# Patient Record
Sex: FEMALE | Race: WHITE | NOT HISPANIC OR LATINO | Employment: OTHER | URBAN - METROPOLITAN AREA
[De-identification: names, ages, dates, MRNs, and addresses within clinical notes are randomized per-mention and may not be internally consistent; named-entity substitution may affect disease eponyms.]

---

## 2017-05-16 ENCOUNTER — OFFICE VISIT (OUTPATIENT)
Dept: OBSTETRICS AND GYNECOLOGY | Facility: CLINIC | Age: 62
End: 2017-05-16

## 2017-05-16 VITALS
HEIGHT: 63 IN | WEIGHT: 147.5 LBS | BODY MASS INDEX: 26.13 KG/M2 | SYSTOLIC BLOOD PRESSURE: 120 MMHG | DIASTOLIC BLOOD PRESSURE: 74 MMHG

## 2017-05-16 DIAGNOSIS — Z13.9 SCREENING: ICD-10-CM

## 2017-05-16 DIAGNOSIS — Z01.411 ENCOUNTER FOR GYNECOLOGICAL EXAMINATION WITH ABNORMAL FINDING: Primary | ICD-10-CM

## 2017-05-16 DIAGNOSIS — N94.10 DYSPAREUNIA IN FEMALE: ICD-10-CM

## 2017-05-16 DIAGNOSIS — K60.0 ACUTE ANAL FISSURE: ICD-10-CM

## 2017-05-16 DIAGNOSIS — N90.4 LICHEN SCLEROSUS ET ATROPHICUS OF THE VULVA: ICD-10-CM

## 2017-05-16 DIAGNOSIS — Z12.4 SCREENING FOR MALIGNANT NEOPLASM OF CERVIX: ICD-10-CM

## 2017-05-16 DIAGNOSIS — Z13.820 OSTEOPOROSIS SCREENING: ICD-10-CM

## 2017-05-16 LAB
BILIRUB BLD-MCNC: NEGATIVE MG/DL
CLARITY, POC: CLEAR
COLOR UR: YELLOW
GLUCOSE UR STRIP-MCNC: NEGATIVE MG/DL
KETONES UR QL: NEGATIVE
LEUKOCYTE EST, POC: NEGATIVE
NITRITE UR-MCNC: NEGATIVE MG/ML
PH UR: 7 [PH] (ref 5–8)
PROT UR STRIP-MCNC: NEGATIVE MG/DL
RBC # UR STRIP: NEGATIVE /UL
SP GR UR: 1.01 (ref 1–1.03)
UROBILINOGEN UR QL: NORMAL

## 2017-05-16 PROCEDURE — 99386 PREV VISIT NEW AGE 40-64: CPT | Performed by: OBSTETRICS & GYNECOLOGY

## 2017-05-16 PROCEDURE — 81002 URINALYSIS NONAUTO W/O SCOPE: CPT | Performed by: OBSTETRICS & GYNECOLOGY

## 2017-05-16 RX ORDER — FAMOTIDINE 10 MG
10 TABLET ORAL 2 TIMES DAILY
COMMUNITY

## 2017-05-16 RX ORDER — LORATADINE 10 MG/1
10 CAPSULE, LIQUID FILLED ORAL EVERY MORNING
COMMUNITY

## 2017-05-16 RX ORDER — CLOBETASOL PROPIONATE 0.5 MG/G
OINTMENT TOPICAL DAILY
Qty: 60 G | Refills: 1 | Status: SHIPPED | OUTPATIENT
Start: 2017-05-16 | End: 2017-06-13

## 2017-05-23 PROBLEM — K60.0 ACUTE ANAL FISSURE: Status: ACTIVE | Noted: 2017-05-23

## 2017-05-23 PROBLEM — N90.4 LICHEN SCLEROSUS ET ATROPHICUS OF THE VULVA: Status: ACTIVE | Noted: 2017-05-23

## 2017-05-23 PROBLEM — N94.10 DYSPAREUNIA IN FEMALE: Status: ACTIVE | Noted: 2017-05-23

## 2017-05-23 LAB
CYTOLOGIST CVX/VAG CYTO: NORMAL
CYTOLOGY CVX/VAG DOC THIN PREP: NORMAL
DX ICD CODE: NORMAL
HIV 1 & 2 AB SER-IMP: NORMAL
HPV I/H RISK 1 DNA CVX QL PROBE+SIG AMP: NORMAL
HPV I/H RISK 4 DNA CVX QL PROBE+SIG AMP: NEGATIVE
Lab: NORMAL
OTHER STN SPEC: NORMAL
PATH REPORT.FINAL DX SPEC: NORMAL
RECOM F/U CVX/VAG CYTO: NORMAL
STAT OF ADQ CVX/VAG CYTO-IMP: NORMAL

## 2017-06-13 ENCOUNTER — OFFICE VISIT (OUTPATIENT)
Dept: OBSTETRICS AND GYNECOLOGY | Facility: CLINIC | Age: 62
End: 2017-06-13

## 2017-06-13 VITALS
BODY MASS INDEX: 25.69 KG/M2 | DIASTOLIC BLOOD PRESSURE: 74 MMHG | SYSTOLIC BLOOD PRESSURE: 120 MMHG | WEIGHT: 145 LBS | HEIGHT: 63 IN

## 2017-06-13 DIAGNOSIS — Z13.9 SCREENING: ICD-10-CM

## 2017-06-13 DIAGNOSIS — K60.0 ACUTE ANAL FISSURE: ICD-10-CM

## 2017-06-13 DIAGNOSIS — N90.4 LICHEN SCLEROSUS ET ATROPHICUS OF THE VULVA: Primary | ICD-10-CM

## 2017-06-13 PROCEDURE — 99213 OFFICE O/P EST LOW 20 MIN: CPT | Performed by: OBSTETRICS & GYNECOLOGY

## 2017-06-14 DIAGNOSIS — Z13.820 OSTEOPOROSIS SCREENING: ICD-10-CM

## 2017-06-14 NOTE — PROGRESS NOTES
"FU VISIT    Chief Complaint: Lichen sclerosis and anal fissure fu      Skylar Godinez is a 61 y.o. patient who presents for follow up of lichen sclerosis and anal fissue. Pt has been using Clobetasol nightly for 4 weeks and Anusol to anus for 2 weeks. Pt states that the pruitis of vulva has resolved. The pain at anus has also resolved.  Chief Complaint   Patient presents with   • Follow-up     using med for lichens             The following portions of the patient's history were reviewed and updated as appropriate: allergies, current medications and problem list.    Review of Systems   Gastrointestinal: Negative for rectal pain.   Genitourinary: Negative for vaginal bleeding and vaginal pain.       /74  Ht 63\" (160 cm)  Wt 145 lb (65.8 kg)  BMI 25.69 kg/m2    Physical Exam   Genitourinary: Rectal exam shows no fissure and no tenderness. There is no tenderness or lesion on the right labia. There is no tenderness or lesion on the left labia.   Genitourinary Comments: Improved area of lichen sclerosis. Anal fissure resolved.         Assessment/Plan   Skylar was seen today for follow-up.    Diagnoses and all orders for this visit:    Lichen sclerosus et atrophicus of the vulva    Screening  -     Cancel: POC Urinalysis Dipstick    Acute anal fissure      62yo for fu of lichen sclerosis and anal fissure    1) gyn HM: pap done last month and not enough cells to report on pap or HPV. Pt declines a repeat pap smear today and states she will fu next year for it.    2) Lichen sclerosis: much improved after using Clobetasol for 1 month. Pt instructed to continue use every other night for 1 month and then to stop. If symptoms return then pt to treat herself for 2 consecutive weeks.    3) Anal fissure: resolved after 2 weeks of Anusol.             Return in about 1 year (around 6/13/2018) for Annual physical.      Isha Ayon DO    6/14/2017  3:32 PM  "

## 2017-10-10 ENCOUNTER — OFFICE VISIT (OUTPATIENT)
Dept: OBSTETRICS AND GYNECOLOGY | Facility: CLINIC | Age: 62
End: 2017-10-10

## 2017-10-10 VITALS — WEIGHT: 144.5 LBS | BODY MASS INDEX: 25.6 KG/M2 | HEIGHT: 63 IN

## 2017-10-10 DIAGNOSIS — N64.4 BREAST PAIN, LEFT: Primary | ICD-10-CM

## 2017-10-10 PROCEDURE — 99213 OFFICE O/P EST LOW 20 MIN: CPT | Performed by: OBSTETRICS & GYNECOLOGY

## 2017-10-10 NOTE — PROGRESS NOTES
"PROBLEM VISIT    Chief Complaint:      Skylar Godinez is a 62 y.o. patient who presents complaining of left breast pain and left axillary pain for the last 3 months. Pt states it feels different to her. Pt had her screening MMG 2/2017 and was told it was normal. Pt admits that her breast tenderness has improved since she started wearing a more supportive bra.  Chief Complaint   Patient presents with   • Breast Pain             The following portions of the patient's history were reviewed and updated as appropriate: allergies, current medications and problem list.    Review of Systems   Gastrointestinal: Negative for abdominal pain.   Genitourinary: Negative for pelvic pain and vaginal bleeding.       Ht 63\" (160 cm)  Wt 144 lb 8 oz (65.5 kg)  BMI 25.6 kg/m2    Physical Exam   Constitutional: She is oriented to person, place, and time. She appears well-developed and well-nourished. No distress.   Pulmonary/Chest: Right breast exhibits tenderness. Right breast exhibits no inverted nipple, no mass, no nipple discharge and no skin change. Left breast exhibits tenderness. Left breast exhibits no inverted nipple, no mass, no nipple discharge and no skin change.   Neurological: She is alert and oriented to person, place, and time.   Skin: She is not diaphoretic.   Psychiatric: She has a normal mood and affect. Her behavior is normal. Judgment and thought content normal.   Vitals reviewed.        Assessment/Plan   Skylar was seen today for breast pain.    Diagnoses and all orders for this visit:    Breast pain, left    63yo with complaints of left breast pain    Physical exam reveals that breast pain is bilateral at lateral aspects of both breasts.  No palpable masses.  Pt reports improvement in pain since she got a more supportive bra.  Recommend starting Vitamin E 400mg po daily.  Recommend coming in for a repeat exam in 3 months and if symptoms persist, will proceed with a dx MMG and US..  Pt agrees with plan.         "       Return in about 3 months (around 1/10/2018).      Isha Ayon DO    10/10/2017  3:35 PM

## 2018-02-20 ENCOUNTER — OFFICE VISIT (OUTPATIENT)
Dept: OBSTETRICS AND GYNECOLOGY | Facility: CLINIC | Age: 63
End: 2018-02-20

## 2018-02-20 VITALS
BODY MASS INDEX: 25.52 KG/M2 | HEIGHT: 63 IN | WEIGHT: 144 LBS | DIASTOLIC BLOOD PRESSURE: 70 MMHG | SYSTOLIC BLOOD PRESSURE: 100 MMHG

## 2018-02-20 DIAGNOSIS — Z80.3 FAMILY HISTORY OF BREAST CANCER: ICD-10-CM

## 2018-02-20 DIAGNOSIS — Z13.9 SCREENING FOR CONDITION: ICD-10-CM

## 2018-02-20 DIAGNOSIS — N64.4 BREAST PAIN, LEFT: Primary | ICD-10-CM

## 2018-02-20 LAB
BILIRUB BLD-MCNC: NEGATIVE MG/DL
CLARITY, POC: CLEAR
COLOR UR: YELLOW
GLUCOSE UR STRIP-MCNC: NEGATIVE MG/DL
KETONES UR QL: NEGATIVE
LEUKOCYTE EST, POC: NEGATIVE
NITRITE UR-MCNC: NEGATIVE MG/ML
PH UR: 5 [PH] (ref 5–8)
PROT UR STRIP-MCNC: NEGATIVE MG/DL
RBC # UR STRIP: NEGATIVE /UL
SP GR UR: 1 (ref 1–1.03)
UROBILINOGEN UR QL: NORMAL

## 2018-02-20 PROCEDURE — 99213 OFFICE O/P EST LOW 20 MIN: CPT | Performed by: OBSTETRICS & GYNECOLOGY

## 2018-02-20 PROCEDURE — 81002 URINALYSIS NONAUTO W/O SCOPE: CPT | Performed by: OBSTETRICS & GYNECOLOGY

## 2018-02-20 RX ORDER — CLOBETASOL PROPIONATE 0.5 MG/G
1 CREAM TOPICAL 2 TIMES DAILY PRN
COMMUNITY
End: 2022-03-31 | Stop reason: SDUPTHER

## 2018-02-20 NOTE — PROGRESS NOTES
"PROBLEM VISIT    Chief Complaint: left breast pain      Skylar Godinez is a 62 y.o. patient who presents for follow up of left breast pain. Pt reports that she is still having left breast pain. She describes it as an ache. Pt never started the Vitamin E. Pt's mother (58) and maternal aunt (70's) both have had breast cancer. Pt's last imaging was 3/2017  Chief Complaint   Patient presents with   • Follow-up     some breast pain.              The following portions of the patient's history were reviewed and updated as appropriate: allergies, current medications and problem list.    Review of Systems   Constitutional: Negative for unexpected weight change.   Gastrointestinal: Negative for abdominal distention and abdominal pain.   Genitourinary: Negative for pelvic pain.       /70  Ht 160 cm (63\")  Wt 65.3 kg (144 lb)  BMI 25.51 kg/m2    Physical Exam   Constitutional: She is oriented to person, place, and time. She appears well-developed and well-nourished. No distress.   Pulmonary/Chest: Right breast exhibits no inverted nipple, no mass, no nipple discharge, no skin change and no tenderness. Left breast exhibits no inverted nipple, no mass, no nipple discharge, no skin change and no tenderness.   No re(roducible pain or masses noted on PE.   Neurological: She is alert and oriented to person, place, and time.   Skin: She is not diaphoretic.   Psychiatric: She has a normal mood and affect. Her behavior is normal. Judgment and thought content normal.   Vitals reviewed.        Assessment/Plan   Skylar was seen today for follow-up.    Diagnoses and all orders for this visit:    Breast pain, left    Screening for condition  -     POC Urinalysis Dipstick    Family history of breast cancer      63yo for fu of left breast pain    1) left breast pain: DPt still with left breast pain. Pain is not reproducible on PE. No palpable masses. Pt reports the ache is deeper than the tissue palpated in breast. Encouraged pt to " start Vitamin E 400mg daily. Will proceed with diagnostic MMG and Left breast US. Script written for exam since pt desires for imaging to be at [facility]. Pt may need referral to breast specialist.    2) Family Hx of breast cancer: Pt declines BRCA screening.    3) Gyn HM: LPS 5/2017             No Follow-up on file.      Isha Ayon,     2/20/2018  9:54 AM

## 2018-07-10 ENCOUNTER — OFFICE VISIT (OUTPATIENT)
Dept: OBSTETRICS AND GYNECOLOGY | Facility: CLINIC | Age: 63
End: 2018-07-10

## 2018-07-10 VITALS
SYSTOLIC BLOOD PRESSURE: 110 MMHG | DIASTOLIC BLOOD PRESSURE: 70 MMHG | WEIGHT: 148 LBS | BODY MASS INDEX: 26.22 KG/M2 | HEIGHT: 63 IN

## 2018-07-10 DIAGNOSIS — Z01.419 WELL WOMAN EXAM WITH ROUTINE GYNECOLOGICAL EXAM: Primary | ICD-10-CM

## 2018-07-10 PROCEDURE — 99396 PREV VISIT EST AGE 40-64: CPT | Performed by: OBSTETRICS & GYNECOLOGY

## 2018-07-10 PROCEDURE — 81002 URINALYSIS NONAUTO W/O SCOPE: CPT | Performed by: OBSTETRICS & GYNECOLOGY

## 2018-07-10 RX ORDER — AMOXICILLIN AND CLAVULANATE POTASSIUM 875; 125 MG/1; MG/1
TABLET, FILM COATED ORAL EVERY 12 HOURS
COMMUNITY
End: 2018-07-16

## 2018-07-10 RX ORDER — KETOCONAZOLE 20 MG/G
CREAM TOPICAL
COMMUNITY
End: 2022-08-01

## 2018-07-10 NOTE — PROGRESS NOTES
GYN Annual Exam     CC- Here for annual exam.     Skylar Godinez is a 62 y.o. female who presents for annual well woman exam. Menopause age 54. No PMPB. No vasomotor symptoms. No hx of HRT. Hx of left breast pain that has since resolved. Still with dyspareunia but never used estrogen cream and declines using it. Pt complaining of a bump on her rectum that is draining. She has been using steroid cream for the last month and the area has not healed.    OB History      Para Term  AB Living    3 2 2   1 2    SAB TAB Ectopic Molar Multiple Live Births    1                    Current contraception: none  History of abnormal Pap smear: no  History of abnormal mammogram: yes - hx of breast biopsy/excision benign.  Family history of uterine, colon or ovarian cancer: yes - paternal aunt with a gynecoloic cancer but pt is unsure if it is ovarian or not?  Family history of breast cancer: yes - mother dx age 58. Maternal aunt dx in 70's.    Health Maintenance   Topic Date Due   • ANNUAL PHYSICAL  08/10/1957   • TDAP/TD VACCINES (1 - Tdap) 08/10/1973   • ZOSTER VACCINE (1 of 2) 08/10/2004   • HEPATITIS C SCREENING  2017   • COLONOSCOPY  2017   • INFLUENZA VACCINE  2018   • MAMMOGRAM  2020   • PAP SMEAR  2020       Past Medical History:   Diagnosis Date   • Lichen sclerosus of female genitalia        Past Surgical History:   Procedure Laterality Date   • ADENOIDECTOMY     • ANAL FISTULA REPAIR     • SPLENECTOMY           Current Outpatient Prescriptions:   •  amoxicillin-clavulanate (AUGMENTIN) 875-125 MG per tablet, Every 12 (Twelve) Hours., Disp: , Rfl:   •  clobetasol (TEMOVATE) 0.05 % cream, 2 (Two) Times a Day., Disp: , Rfl:   •  famotidine (PEPCID) 10 MG tablet, Take 10 mg by mouth 2 (Two) Times a Day., Disp: , Rfl:   •  ketoconazole (NIZORAL) 2 % cream, ketoconazole 2 % topical cream  APPLY TO THE AFFECTED AREA(S) BY TOPICAL ROUTE TWO TIMES A DAY, Disp: , Rfl:   •  Loratadine  "(CLARITIN) 10 MG capsule, Take  by mouth., Disp: , Rfl:   •  PROCTO-MED HC 2.5 % rectal cream, APPLY TO AFFECTED AREA(S)  RECTALLY TWO TIMES A DAY, Disp: 30 g, Rfl: 0    Allergies   Allergen Reactions   • Sulfa Antibiotics Other (See Comments)     Caused bleeding        Social History   Substance Use Topics   • Smoking status: Never Smoker   • Smokeless tobacco: Never Used   • Alcohol use Yes      Comment: occ       Family History   Problem Relation Age of Onset   • Prostate cancer Father    • Breast cancer Mother    • Hypertension Mother    • Hypertension Paternal Grandfather    • Brain cancer Paternal Grandmother    • Melanoma Maternal Grandmother    • Hypertension Maternal Grandfather    • Breast cancer Maternal Aunt        Review of Systems   Gastrointestinal: Positive for rectal pain. Negative for abdominal distention, abdominal pain, anal bleeding, blood in stool, constipation and diarrhea.   Genitourinary: Positive for dyspareunia. Negative for menstrual problem, pelvic pain, vaginal bleeding, vaginal discharge and vaginal pain.       /70   Ht 160 cm (63\")   Wt 67.1 kg (148 lb)   Breastfeeding? No   BMI 26.22 kg/m²     Physical Exam   Constitutional: She is oriented to person, place, and time. She appears well-developed and well-nourished.   HENT:   Mouth/Throat: Normal dentition. No dental caries.   Cardiovascular: Normal rate and regular rhythm.    Pulmonary/Chest: Effort normal and breath sounds normal. Right breast exhibits no inverted nipple, no mass, no nipple discharge, no skin change and no tenderness. Left breast exhibits no inverted nipple, no mass, no nipple discharge, no skin change and no tenderness.   Abdominal: Soft. She exhibits no distension and no mass. There is no tenderness.   Genitourinary: There is no rash, tenderness or lesion on the right labia. There is no rash, tenderness or lesion on the left labia. Uterus is not deviated, not enlarged, not fixed and not tender. Cervix " exhibits no motion tenderness, no discharge and no friability. Right adnexum displays no mass, no tenderness and no fullness. Left adnexum displays no mass, no tenderness and no fullness. No tenderness or bleeding in the vagina. No vaginal discharge found.   Genitourinary Comments: Small pinpoint opening on anus that is draining minimal amount of clear fluid.   Neurological: She is alert and oriented to person, place, and time.   Psychiatric: She has a normal mood and affect. Her behavior is normal. Judgment and thought content normal.   Vitals reviewed.         Assessment/Plan    1) GYN HM: check pap smear. Last year pap smear did not have enough cells to be evaluated but HR HPV neg. MMG UTD 2/2018.  Colonosocpy done 2010- no polyps.  2) Dyspareunia: Using lubricants. Declines estrogen cream  3) Bone health - Weight bearing exercise, dietary calcium recommendations and vitamin D reviewed. Had a bone density 5 years ago and was normal. Mother with ?osteoporosis. Bone density done 5/2017.  4) Hx of anal fistula repair: Now has a pinpoint draining area on anus. Pt will return to surgeon to be evaluated.  5) Smoking Status: non smoker  6) Suspected lichen sclerosis: Pt reports that I performed a biopsy on the area several years ago and it was benign. Discussed using Clobetasol x 2 weeks qhs for flares.  7) Osteopenia: T score of spine -2.2 in 5/2017. FRAX reveals pt is not a candidate for treatment at this time  8) Follow up 1 year/prn    Diagnoses and all orders for this visit:    Well woman exam with routine gynecological exam  -     POC Urinalysis Dipstick  -     Pap IG, HPV-hr    Other orders  -     amoxicillin-clavulanate (AUGMENTIN) 875-125 MG per tablet; Every 12 (Twelve) Hours.  -     ketoconazole (NIZORAL) 2 % cream; ketoconazole 2 % topical cream   APPLY TO THE AFFECTED AREA(S) BY TOPICAL ROUTE TWO TIMES A DAY        Isha Ayon DO  7/10/2018  9:38 AM

## 2018-07-13 LAB
CYTOLOGIST CVX/VAG CYTO: NORMAL
CYTOLOGY CVX/VAG DOC THIN PREP: NORMAL
DX ICD CODE: NORMAL
HIV 1 & 2 AB SER-IMP: NORMAL
HPV I/H RISK 1 DNA CVX QL PROBE+SIG AMP: NORMAL
HPV I/H RISK 4 DNA CVX QL PROBE+SIG AMP: NEGATIVE
Lab: NORMAL
OTHER STN SPEC: NORMAL
PATH REPORT.FINAL DX SPEC: NORMAL
STAT OF ADQ CVX/VAG CYTO-IMP: NORMAL

## 2018-07-16 ENCOUNTER — OFFICE VISIT (OUTPATIENT)
Dept: SURGERY | Facility: CLINIC | Age: 63
End: 2018-07-16

## 2018-07-16 VITALS
WEIGHT: 148 LBS | HEIGHT: 63 IN | BODY MASS INDEX: 26.22 KG/M2 | SYSTOLIC BLOOD PRESSURE: 124 MMHG | DIASTOLIC BLOOD PRESSURE: 78 MMHG

## 2018-07-16 DIAGNOSIS — K60.3 FISTULA-IN-ANO: Primary | ICD-10-CM

## 2018-07-16 PROCEDURE — 99203 OFFICE O/P NEW LOW 30 MIN: CPT | Performed by: SURGERY

## 2018-07-16 NOTE — PROGRESS NOTES
"    PATIENT INFORMATION  Skylar Godinez  POSSIBLE ANAL FISTULA, \"BUMP\" BELOW TAILBONE, SOME DRAINAGE, HAS SEEN VALENTIN IN THE PAST     - 1955    CHIEF COMPLAINT  No chief complaint on file.      HISTORY OF PRESENT ILLNESS  HPI she complains of some perirectal pain and drainage.  She had a fistulotomy 6 years ago by me.  Says her several months ago she had some drainage at another area around her anus that resolved spontaneously now she has an opening between her sacrum and her anus.  She denies any fevers or chills she denies any history of inflammatory bowel disease her last colonoscopy was greater than 6 years ago.  sHe has been using some steroid cream on the area.        REVIEW OF SYSTEMS  Review of Systems negative      ACTIVE PROBLEMS  Patient Active Problem List    Diagnosis   • Family history of breast cancer [Z80.3]   • Breast pain, left [N64.4]   • Acute anal fissure [K60.0]   • Dyspareunia in female [N94.10]   • Lichen sclerosus et atrophicus of the vulva [N90.4]   • Anemia [D64.9]   • Gastroesophageal reflux disease [K21.9]   • Hyperlipidemia [E78.5]         PAST MEDICAL HISTORY  Past Medical History:   Diagnosis Date   • Lichen sclerosus of female genitalia          SURGICAL HISTORY  Past Surgical History:   Procedure Laterality Date   • ADENOIDECTOMY     • ANAL FISTULA REPAIR     • SPLENECTOMY           FAMILY HISTORY  Family History   Problem Relation Age of Onset   • Prostate cancer Father    • Breast cancer Mother    • Hypertension Mother    • Hypertension Paternal Grandfather    • Brain cancer Paternal Grandmother    • Melanoma Maternal Grandmother    • Hypertension Maternal Grandfather    • Breast cancer Maternal Aunt          SOCIAL HISTORY  Social History     Occupational History   • Not on file.     Social History Main Topics   • Smoking status: Never Smoker   • Smokeless tobacco: Never Used   • Alcohol use Yes      Comment: occ   • Drug use: No   • Sexual activity: Yes     Partners: Male "         CURRENT MEDICATIONS    Current Outpatient Prescriptions:   •  amoxicillin-clavulanate (AUGMENTIN) 875-125 MG per tablet, Every 12 (Twelve) Hours., Disp: , Rfl:   •  clobetasol (TEMOVATE) 0.05 % cream, 2 (Two) Times a Day., Disp: , Rfl:   •  famotidine (PEPCID) 10 MG tablet, Take 10 mg by mouth 2 (Two) Times a Day., Disp: , Rfl:   •  ketoconazole (NIZORAL) 2 % cream, ketoconazole 2 % topical cream  APPLY TO THE AFFECTED AREA(S) BY TOPICAL ROUTE TWO TIMES A DAY, Disp: , Rfl:   •  Loratadine (CLARITIN) 10 MG capsule, Take  by mouth., Disp: , Rfl:   •  PROCTO-MED HC 2.5 % rectal cream, APPLY TO AFFECTED AREA(S)  RECTALLY TWO TIMES A DAY, Disp: 30 g, Rfl: 0    ALLERGIES  Sulfa antibiotics    VITALS  There were no vitals filed for this visit.    LAST RESULTS   Office Visit on 07/10/2018   Component Date Value Ref Range Status   • Color 07/10/2018 Yellow  Yellow, Straw, Dark Yellow, Elinor Final   • Clarity, UA 07/10/2018 Clear  Clear Final   • Glucose, UA 07/10/2018 Negative  Negative, 1000 mg/dL (3+) mg/dL Final   • Bilirubin 07/10/2018 Negative  Negative Final   • Ketones, UA 07/10/2018 Negative  Negative Final   • Specific Gravity  07/10/2018 1.005  1.005 - 1.030 Final   • Blood, UA 07/10/2018 Negative  Negative Final   • pH, Urine 07/10/2018 5.0  5.0 - 8.0 Final   • Protein, POC 07/10/2018 Negative  Negative mg/dL Final   • Urobilinogen, UA 07/10/2018 Normal  Normal Final   • Leukocytes 07/10/2018 Negative  Negative Final   • Nitrite, UA 07/10/2018 Negative  Negative Final   • Diagnosis 07/10/2018 Comment   Final    Comment: NEGATIVE FOR INTRAEPITHELIAL LESION AND MALIGNANCY.  THIS SPECIMEN WAS RESCREENED AS PART OF OUR  PROGRAM.     • Specimen adequacy: 07/10/2018 Comment   Final    Comment: Satisfactory for evaluation.  Endocervical and/or squamous metaplastic  cells (endocervical component) are present.     • Clinician Provided ICD-10: 07/10/2018 Comment   Final    Z01.419   • Performed by:  07/10/2018 Comment   Final    Johanne Sky, Cytotechnologist ([facility])   • QC reviewed by: 07/10/2018 Comment   Final    Arely Brooks, Supervisory Cytotechnologist ([facility])   • . 07/10/2018 .   Final   • Note: 07/10/2018 Comment   Final    Comment: The Pap smear is a screening test designed to aid in the detection of  premalignant and malignant conditions of the uterine cervix.  It is not a  diagnostic procedure and should not be used as the sole means of detecting  cervical cancer.  Both false-positive and false-negative reports do occur.     • Method: 07/10/2018 Comment   Final    Comment: This liquid based ThinPrep(R) pap test was screened with the  use of an image guided system.     • HPV, high-risk 07/10/2018 CANCELED   Final-Edited    Comment: The quantity of specimen remaining in the vial after Pap slide  preparation was less than the 4 mL minimum cell suspension required.  Low sample cellularity may be the cause.  See HPV, low volume rfx  test result.  This high-risk HPV test detects thirteen high-risk types  (16/18/31/33/35/39/45/51/52/56/58/59/68) without differentiation.    Result canceled by the ancillary     • HPV, low volume rfx 07/10/2018 Negative  Negative Final    Comment: This test detects fourteen high-risk HPV types (16,18,31,33,35,39,45,  51,52,56,58,59,66,68) without differentiation.       No results found.    PHYSICAL EXAM  Physical Exam solar white female in no active distress.  She has a pinhole opening approximately 2 cm away from her anal verge between the anus and the sacrum.  There is no cellulitis or abscess.  I feel this is most consistent with a fistula in anal.  I reviewed her prior operative note and she had a fistula that was superficial to the sphincter mechanism and a fistulotomy was performed.  She denies any anal incontinence.    ASSESSMENT  Fistula in anal      PLAN  The risks benefits and options were discussed with the patient in detail.  We will proceed with Flagyl 250 mg by  mouth 3 times a day ×2 weeks.  She is to stop the steroid cream and start sitz baths.  I will see her back in 2 weeks.

## 2018-07-30 ENCOUNTER — OFFICE VISIT (OUTPATIENT)
Dept: SURGERY | Facility: CLINIC | Age: 63
End: 2018-07-30

## 2018-07-30 VITALS
BODY MASS INDEX: 26.58 KG/M2 | SYSTOLIC BLOOD PRESSURE: 122 MMHG | DIASTOLIC BLOOD PRESSURE: 72 MMHG | WEIGHT: 150 LBS | HEIGHT: 63 IN

## 2018-07-30 DIAGNOSIS — K60.3 FISTULA-IN-ANO: Primary | ICD-10-CM

## 2018-07-30 PROCEDURE — 99212 OFFICE O/P EST SF 10 MIN: CPT | Performed by: SURGERY

## 2018-07-30 RX ORDER — METRONIDAZOLE 250 MG/1
250 TABLET ORAL 3 TIMES DAILY
Qty: 21 TABLET | Refills: 0 | Status: SHIPPED | OUTPATIENT
Start: 2018-07-30 | End: 2018-08-06

## 2018-07-30 NOTE — H&P
PATIENT INFORMATION  Skylar Godinez  F/U FISTULA-IN-ANO, FINISHED ABX TODAY, AREA IS ABOUT THE SAME     - 1955    CHIEF COMPLAINT  Chief Complaint   Patient presents with   • Follow-up       HISTORY OF PRESENT ILLNESS  HPI she has completed her antibiotics and still has the drainage.  She's complaints of some swelling in the area.  She denies any fevers or chills.  She has been doing the sitz baths.        REVIEW OF SYSTEMS  Review of Systems otherwise negative      ACTIVE PROBLEMS  Patient Active Problem List    Diagnosis   • Family history of breast cancer [Z80.3]   • Breast pain, left [N64.4]   • Acute anal fissure [K60.0]   • Dyspareunia in female [N94.10]   • Lichen sclerosus et atrophicus of the vulva [N90.4]   • Anemia [D64.9]   • Gastroesophageal reflux disease [K21.9]   • Hyperlipidemia [E78.5]         PAST MEDICAL HISTORY  Past Medical History:   Diagnosis Date   • Lichen sclerosus of female genitalia          SURGICAL HISTORY  Past Surgical History:   Procedure Laterality Date   • ADENOIDECTOMY     • ANAL FISTULA REPAIR     • SPLENECTOMY           FAMILY HISTORY  Family History   Problem Relation Age of Onset   • Prostate cancer Father    • Breast cancer Mother    • Hypertension Mother    • Hypertension Paternal Grandfather    • Brain cancer Paternal Grandmother    • Melanoma Maternal Grandmother    • Hypertension Maternal Grandfather    • Breast cancer Maternal Aunt          SOCIAL HISTORY  Social History     Occupational History   • Not on file.     Social History Main Topics   • Smoking status: Never Smoker   • Smokeless tobacco: Never Used   • Alcohol use Yes      Comment: occ   • Drug use: No   • Sexual activity: Yes     Partners: Male         CURRENT MEDICATIONS    Current Outpatient Prescriptions:   •  clobetasol (TEMOVATE) 0.05 % cream, 2 (Two) Times a Day., Disp: , Rfl:   •  famotidine (PEPCID) 10 MG tablet, Take 10 mg by mouth 2 (Two) Times a Day., Disp: , Rfl:   •  ketoconazole  "(NIZORAL) 2 % cream, ketoconazole 2 % topical cream  APPLY TO THE AFFECTED AREA(S) BY TOPICAL ROUTE TWO TIMES A DAY, Disp: , Rfl:   •  Loratadine (CLARITIN) 10 MG capsule, Take  by mouth., Disp: , Rfl:   •  PROCTO-MED HC 2.5 % rectal cream, APPLY TO AFFECTED AREA(S)  RECTALLY TWO TIMES A DAY, Disp: 30 g, Rfl: 0    ALLERGIES  Sulfa antibiotics    VITALS  Vitals:    07/30/18 1320   BP: 122/72   Weight: 68 kg (150 lb)   Height: 160 cm (63\")       LAST RESULTS   Office Visit on 07/10/2018   Component Date Value Ref Range Status   • Color 07/10/2018 Yellow  Yellow, Straw, Dark Yellow, Elinor Final   • Clarity, UA 07/10/2018 Clear  Clear Final   • Glucose, UA 07/10/2018 Negative  Negative, 1000 mg/dL (3+) mg/dL Final   • Bilirubin 07/10/2018 Negative  Negative Final   • Ketones, UA 07/10/2018 Negative  Negative Final   • Specific Gravity  07/10/2018 1.005  1.005 - 1.030 Final   • Blood, UA 07/10/2018 Negative  Negative Final   • pH, Urine 07/10/2018 5.0  5.0 - 8.0 Final   • Protein, POC 07/10/2018 Negative  Negative mg/dL Final   • Urobilinogen, UA 07/10/2018 Normal  Normal Final   • Leukocytes 07/10/2018 Negative  Negative Final   • Nitrite, UA 07/10/2018 Negative  Negative Final   • Diagnosis 07/10/2018 Comment   Final    Comment: NEGATIVE FOR INTRAEPITHELIAL LESION AND MALIGNANCY.  THIS SPECIMEN WAS RESCREENED AS PART OF OUR  PROGRAM.     • Specimen adequacy: 07/10/2018 Comment   Final    Comment: Satisfactory for evaluation.  Endocervical and/or squamous metaplastic  cells (endocervical component) are present.     • Clinician Provided ICD-10: 07/10/2018 Comment   Final    Z01.419   • Performed by: 07/10/2018 Comment   Final    Johanne Sky, Cytotechnologist ([facility])   • QC reviewed by: 07/10/2018 Comment   Final    Arely Brooks, Supervisory Cytotechnologist ([facility])   • . 07/10/2018 .   Final   • Note: 07/10/2018 Comment   Final    Comment: The Pap smear is a screening test designed to aid in the detection " of  premalignant and malignant conditions of the uterine cervix.  It is not a  diagnostic procedure and should not be used as the sole means of detecting  cervical cancer.  Both false-positive and false-negative reports do occur.     • Method: 07/10/2018 Comment   Final    Comment: This liquid based ThinPrep(R) pap test was screened with the  use of an image guided system.     • HPV, high-risk 07/10/2018 CANCELED   Final-Edited    Comment: The quantity of specimen remaining in the vial after Pap slide  preparation was less than the 4 mL minimum cell suspension required.  Low sample cellularity may be the cause.  See HPV, low volume rfx  test result.  This high-risk HPV test detects thirteen high-risk types  (16/18/31/33/35/39/45/51/52/56/58/59/68) without differentiation.    Result canceled by the ancillary     • HPV, low volume rfx 07/10/2018 Negative  Negative Final    Comment: This test detects fourteen high-risk HPV types (16,18,31,33,35,39,45,  51,52,56,58,59,66,68) without differentiation.       No results found.    PHYSICAL EXAM  Physical Exam alert white female in no active distress.  Her heart shows regular rate and rhythm her lungs are clear and equal she has the perianal sinus opening with mild induration but no real abscess.  This is unchanged from before.    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  We discussed going to surgery doing a fistulotomy versus seton versus an opinion from a colorectal surgeon.  She wants to think things over and get back to me.  Patient called back and wants to proceed with surgery.  This has been set up for [facility] on August 6

## 2018-07-30 NOTE — PROGRESS NOTES
PATIENT INFORMATION  Skylar Godinez  F/U FISTULA-IN-ANO, FINISHED ABX TODAY, AREA IS ABOUT THE SAME     - 1955    CHIEF COMPLAINT  Chief Complaint   Patient presents with   • Follow-up       HISTORY OF PRESENT ILLNESS  HPI she has completed her antibiotics and still has the drainage.  She's complaints of some swelling in the area.  She denies any fevers or chills.  She has been doing the sitz baths.        REVIEW OF SYSTEMS  Review of Systems otherwise negative      ACTIVE PROBLEMS  Patient Active Problem List    Diagnosis   • Family history of breast cancer [Z80.3]   • Breast pain, left [N64.4]   • Acute anal fissure [K60.0]   • Dyspareunia in female [N94.10]   • Lichen sclerosus et atrophicus of the vulva [N90.4]   • Anemia [D64.9]   • Gastroesophageal reflux disease [K21.9]   • Hyperlipidemia [E78.5]         PAST MEDICAL HISTORY  Past Medical History:   Diagnosis Date   • Lichen sclerosus of female genitalia          SURGICAL HISTORY  Past Surgical History:   Procedure Laterality Date   • ADENOIDECTOMY     • ANAL FISTULA REPAIR     • SPLENECTOMY           FAMILY HISTORY  Family History   Problem Relation Age of Onset   • Prostate cancer Father    • Breast cancer Mother    • Hypertension Mother    • Hypertension Paternal Grandfather    • Brain cancer Paternal Grandmother    • Melanoma Maternal Grandmother    • Hypertension Maternal Grandfather    • Breast cancer Maternal Aunt          SOCIAL HISTORY  Social History     Occupational History   • Not on file.     Social History Main Topics   • Smoking status: Never Smoker   • Smokeless tobacco: Never Used   • Alcohol use Yes      Comment: occ   • Drug use: No   • Sexual activity: Yes     Partners: Male         CURRENT MEDICATIONS    Current Outpatient Prescriptions:   •  clobetasol (TEMOVATE) 0.05 % cream, 2 (Two) Times a Day., Disp: , Rfl:   •  famotidine (PEPCID) 10 MG tablet, Take 10 mg by mouth 2 (Two) Times a Day., Disp: , Rfl:   •  ketoconazole  "(NIZORAL) 2 % cream, ketoconazole 2 % topical cream  APPLY TO THE AFFECTED AREA(S) BY TOPICAL ROUTE TWO TIMES A DAY, Disp: , Rfl:   •  Loratadine (CLARITIN) 10 MG capsule, Take  by mouth., Disp: , Rfl:   •  PROCTO-MED HC 2.5 % rectal cream, APPLY TO AFFECTED AREA(S)  RECTALLY TWO TIMES A DAY, Disp: 30 g, Rfl: 0    ALLERGIES  Sulfa antibiotics    VITALS  Vitals:    07/30/18 1320   BP: 122/72   Weight: 68 kg (150 lb)   Height: 160 cm (63\")       LAST RESULTS   Office Visit on 07/10/2018   Component Date Value Ref Range Status   • Color 07/10/2018 Yellow  Yellow, Straw, Dark Yellow, Elinor Final   • Clarity, UA 07/10/2018 Clear  Clear Final   • Glucose, UA 07/10/2018 Negative  Negative, 1000 mg/dL (3+) mg/dL Final   • Bilirubin 07/10/2018 Negative  Negative Final   • Ketones, UA 07/10/2018 Negative  Negative Final   • Specific Gravity  07/10/2018 1.005  1.005 - 1.030 Final   • Blood, UA 07/10/2018 Negative  Negative Final   • pH, Urine 07/10/2018 5.0  5.0 - 8.0 Final   • Protein, POC 07/10/2018 Negative  Negative mg/dL Final   • Urobilinogen, UA 07/10/2018 Normal  Normal Final   • Leukocytes 07/10/2018 Negative  Negative Final   • Nitrite, UA 07/10/2018 Negative  Negative Final   • Diagnosis 07/10/2018 Comment   Final    Comment: NEGATIVE FOR INTRAEPITHELIAL LESION AND MALIGNANCY.  THIS SPECIMEN WAS RESCREENED AS PART OF OUR  PROGRAM.     • Specimen adequacy: 07/10/2018 Comment   Final    Comment: Satisfactory for evaluation.  Endocervical and/or squamous metaplastic  cells (endocervical component) are present.     • Clinician Provided ICD-10: 07/10/2018 Comment   Final    Z01.419   • Performed by: 07/10/2018 Comment   Final    Johanne Sky, Cytotechnologist (ASC)   • QC reviewed by: 07/10/2018 Comment   Final    Arely Brooks, Supervisory Cytotechnologist (ASCP)   • . 07/10/2018 .   Final   • Note: 07/10/2018 Comment   Final    Comment: The Pap smear is a screening test designed to aid in the detection " of  premalignant and malignant conditions of the uterine cervix.  It is not a  diagnostic procedure and should not be used as the sole means of detecting  cervical cancer.  Both false-positive and false-negative reports do occur.     • Method: 07/10/2018 Comment   Final    Comment: This liquid based ThinPrep(R) pap test was screened with the  use of an image guided system.     • HPV, high-risk 07/10/2018 CANCELED   Final-Edited    Comment: The quantity of specimen remaining in the vial after Pap slide  preparation was less than the 4 mL minimum cell suspension required.  Low sample cellularity may be the cause.  See HPV, low volume rfx  test result.  This high-risk HPV test detects thirteen high-risk types  (16/18/31/33/35/39/45/51/52/56/58/59/68) without differentiation.    Result canceled by the ancillary     • HPV, low volume rfx 07/10/2018 Negative  Negative Final    Comment: This test detects fourteen high-risk HPV types (16,18,31,33,35,39,45,  51,52,56,58,59,66,68) without differentiation.       No results found.    PHYSICAL EXAM  Physical Exam alert white female in no active distress.  Her heart shows regular rate and rhythm her lungs are clear and equal she has the perianal sinus opening with mild induration but no real abscess.  This is unchanged from before.    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  We discussed going to surgery doing a fistulotomy versus seton versus an opinion from a colorectal surgeon.  She wants to think things over and get back to me.  Patient called back and wants to proceed with surgery.  This has been set up for [facility] on August 6

## 2018-08-01 ENCOUNTER — APPOINTMENT (OUTPATIENT)
Dept: PREADMISSION TESTING | Facility: HOSPITAL | Age: 63
End: 2018-08-01

## 2018-08-01 VITALS
HEIGHT: 63 IN | WEIGHT: 146 LBS | BODY MASS INDEX: 25.87 KG/M2 | HEART RATE: 82 BPM | RESPIRATION RATE: 16 BRPM | OXYGEN SATURATION: 98 % | DIASTOLIC BLOOD PRESSURE: 66 MMHG | SYSTOLIC BLOOD PRESSURE: 119 MMHG

## 2018-08-01 DIAGNOSIS — K60.3 FISTULA-IN-ANO: ICD-10-CM

## 2018-08-01 LAB
ANION GAP SERPL CALCULATED.3IONS-SCNC: 12.6 MMOL/L
BUN BLD-MCNC: 13 MG/DL (ref 8–23)
BUN/CREAT SERPL: 18.8 (ref 7–25)
CALCIUM SPEC-SCNC: 9.8 MG/DL (ref 8.8–10.5)
CHLORIDE SERPL-SCNC: 95 MMOL/L (ref 98–107)
CO2 SERPL-SCNC: 26.4 MMOL/L (ref 22–29)
CREAT BLD-MCNC: 0.69 MG/DL (ref 0.57–1)
DEPRECATED RDW RBC AUTO: 41.1 FL (ref 37–54)
ERYTHROCYTE [DISTWIDTH] IN BLOOD BY AUTOMATED COUNT: 11.9 % (ref 11.5–14.5)
GFR SERPL CREATININE-BSD FRML MDRD: 86 ML/MIN/1.73
GLUCOSE BLD-MCNC: 104 MG/DL (ref 65–99)
HCT VFR BLD AUTO: 42.1 % (ref 37–47)
HGB BLD-MCNC: 15.2 G/DL (ref 12–16)
MCH RBC QN AUTO: 33.8 PG (ref 27–31)
MCHC RBC AUTO-ENTMCNC: 36.1 G/DL (ref 31–37)
MCV RBC AUTO: 93.6 FL (ref 81–99)
PLATELET # BLD AUTO: 635 10*3/MM3 (ref 140–500)
PMV BLD AUTO: 8.2 FL (ref 7.4–10.4)
POTASSIUM BLD-SCNC: 4.1 MMOL/L (ref 3.5–5.2)
RBC # BLD AUTO: 4.5 10*6/MM3 (ref 4.2–5.4)
SODIUM BLD-SCNC: 134 MMOL/L (ref 136–145)
WBC NRBC COR # BLD: 11.73 10*3/MM3 (ref 4.8–10.8)

## 2018-08-01 PROCEDURE — 36415 COLL VENOUS BLD VENIPUNCTURE: CPT

## 2018-08-01 PROCEDURE — 93010 ELECTROCARDIOGRAM REPORT: CPT | Performed by: INTERNAL MEDICINE

## 2018-08-01 PROCEDURE — 85027 COMPLETE CBC AUTOMATED: CPT | Performed by: SURGERY

## 2018-08-01 PROCEDURE — 80048 BASIC METABOLIC PNL TOTAL CA: CPT | Performed by: SURGERY

## 2018-08-01 PROCEDURE — 93005 ELECTROCARDIOGRAM TRACING: CPT

## 2018-08-01 RX ORDER — IBUPROFEN 200 MG
200 TABLET ORAL EVERY 6 HOURS PRN
COMMUNITY
End: 2018-08-06 | Stop reason: HOSPADM

## 2018-08-01 NOTE — DISCHARGE INSTRUCTIONS
TO STOP CLEARS/GATORADE @ 5:30 AM    PRE-ADMISSION TESTING INSTRUCTIONS FOR ADULTS    Take these medications the morning of surgery with a small sip of water: PEPCID      No aspirin, advil, aleve, ibuprofen, naproxen, diet pills, decongestants, or herbal/vitamins for a week prior to surgery.    General Instructions:    • Do not eat solid food after midnight the night before surgery.  No gum, mints, or hard candy after midnight the night before surgery.  • You may drink clear liquids the day of surgery up until 2 hours before your arrival time.  • Clear liquids are liquids you can see through. Nothing RED in color.    Plain water    Sports drinks  Sodas     Gelatin (Jell-O)  Fruit juices without pulp such as white grape juice and apple juice  Popsicles that contain no fruit or yogurt  Tea or coffee (no cream or milk added)    • It is beneficial for you to have a clear drink that contains carbohydrates just before you leave your house and before your fasting time begins.  We suggest a 20 ounce bottle of Gatorade or Powerade for non-diabetic patients or a 20 ounce bottle of G2 or Powerade Zero for diabetic patients.     • Patients who avoid smoking, chewing tobacco and alcohol for 4 weeks prior to surgery have a reduced risk of post-operative complications.  If at all possible, quit smoking as many days before surgery as you can.    • Do not smoke, use chewing tobacco or drink alcohol the day of surgery    • Bring your C-PAP/ BI-PAP machine if you use one.  • Wear clean comfortable clothes and socks.  • Do not wear contact lenses, lotion, deodorant, or make-up.  Bring a case for your glasses if applicable. You may brush your teeth the morning of surgery.  • You may wear dentures/partials, do not put adhesive/glue on them.  • Bring crutches or walker if applicable.  • Leave all other jewelry and valuables at home.      Preventing a Surgical Site Infection:    • Shower the night before and on the morning of surgery using  the chlorhexidine soap you were given.  Use a clean washcloth with the soap.  Place clean sheets on your bed after showering the night before surgery. Do not use the CHG soap on your hair, face, or private areas. Wash your body gently for five (5) minutes. Do not scrub your skin.  Dry with a clean towel and dress in clean clothing.    • Do not shave the surgical area for 10 days-2 weeks prior to surgery  because the razor can irritate skin and make it easier to develop an infection.  • Make sure you, your family, and all healthcare providers clean their hands with soap and water or an alcohol based hand  before caring for you or your wound.      Day of surgery:    Your surgeon’s office will advise you of your arrival time for the day of surgery.    Upon arrival, a Pre-op nurse and Anesthesia provider will review your health history, obtain vital signs, and answer questions you may have.  The only belongings needed at this time will be your home medications and if applicable your C-PAP/BI-PAP machine.  If you are staying overnight your family can leave the rest of your belongings in the car and bring them to your room later.  A Pre-op nurse will start an IV and you may receive medication in preparation for surgery, including something to help you relax.  Your family will be able to see you in the Pre-op area.  While you are in surgery your family should notify the waiting room  if they leave the waiting room area and provide a contact phone number.    IF you have any questions, you can call the Pre-Admission Department at [phone] or your surgeon's office.  Notify your surgeon if  you become sick, have a fever, productive cough, or cannot be here the day of surgery    Please be aware that surgery does come with discomfort.  We want to make every effort to control your discomfort so please discuss any uncontrolled symptoms with your nurse.   Your doctor will most likely have prescribed pain  medications.      If you are going home after surgery, you will receive individualized written care instructions before being discharged.  A responsible adult (over the age of 18) must drive you to and from the hospital on the day of your surgery and stay with you for 24 hours after anesthesia.    If you are staying overnight following surgery, you will be transported to your hospital room following the recovery period.  [facility] has all private rooms.    Deductibles and co-payments are collected on the day of service. Please be prepared to pay the required co-pay, deductible or deposit on the day of service as defined by your plan.

## 2018-08-03 ENCOUNTER — ANESTHESIA EVENT (OUTPATIENT)
Dept: PERIOP | Facility: HOSPITAL | Age: 63
End: 2018-08-03

## 2018-08-06 ENCOUNTER — ANESTHESIA (OUTPATIENT)
Dept: PERIOP | Facility: HOSPITAL | Age: 63
End: 2018-08-06

## 2018-08-06 ENCOUNTER — HOSPITAL ENCOUNTER (OUTPATIENT)
Facility: HOSPITAL | Age: 63
Setting detail: HOSPITAL OUTPATIENT SURGERY
Discharge: HOME | End: 2018-08-06
Attending: SURGERY | Admitting: SURGERY

## 2018-08-06 VITALS
OXYGEN SATURATION: 96 % | DIASTOLIC BLOOD PRESSURE: 82 MMHG | WEIGHT: 147 LBS | RESPIRATION RATE: 16 BRPM | SYSTOLIC BLOOD PRESSURE: 125 MMHG | BODY MASS INDEX: 26.04 KG/M2 | HEART RATE: 78 BPM | TEMPERATURE: 97.8 F

## 2018-08-06 DIAGNOSIS — K60.3 FISTULA-IN-ANO: ICD-10-CM

## 2018-08-06 PROCEDURE — 25010000002 FENTANYL CITRATE (PF) 100 MCG/2ML SOLUTION: Performed by: NURSE ANESTHETIST, CERTIFIED REGISTERED

## 2018-08-06 PROCEDURE — 25010000002 PROPOFOL 10 MG/ML EMULSION: Performed by: NURSE ANESTHETIST, CERTIFIED REGISTERED

## 2018-08-06 PROCEDURE — 25010000002 KETOROLAC TROMETHAMINE PER 15 MG: Performed by: NURSE ANESTHETIST, CERTIFIED REGISTERED

## 2018-08-06 PROCEDURE — 25010000002 SUCCINYLCHOLINE PER 20 MG: Performed by: NURSE ANESTHETIST, CERTIFIED REGISTERED

## 2018-08-06 PROCEDURE — 25010000002 DEXAMETHASONE PER 1 MG: Performed by: NURSE ANESTHETIST, CERTIFIED REGISTERED

## 2018-08-06 PROCEDURE — 45300 PROCTOSIGMOIDOSCOPY DX: CPT | Performed by: SURGERY

## 2018-08-06 PROCEDURE — 25010000002 ONDANSETRON PER 1 MG: Performed by: NURSE ANESTHETIST, CERTIFIED REGISTERED

## 2018-08-06 PROCEDURE — 46270 REMOVE ANAL FIST SUBQ: CPT | Performed by: SURGERY

## 2018-08-06 PROCEDURE — 25010000002 MIDAZOLAM PER 1 MG: Performed by: NURSE ANESTHETIST, CERTIFIED REGISTERED

## 2018-08-06 RX ORDER — SODIUM CHLORIDE, SODIUM LACTATE, POTASSIUM CHLORIDE, CALCIUM CHLORIDE 600; 310; 30; 20 MG/100ML; MG/100ML; MG/100ML; MG/100ML
50 INJECTION, SOLUTION INTRAVENOUS CONTINUOUS
Status: DISCONTINUED | OUTPATIENT
Start: 2018-08-06 | End: 2018-08-06 | Stop reason: HOSPADM

## 2018-08-06 RX ORDER — FENTANYL CITRATE 50 UG/ML
INJECTION, SOLUTION INTRAMUSCULAR; INTRAVENOUS AS NEEDED
Status: DISCONTINUED | OUTPATIENT
Start: 2018-08-06 | End: 2018-08-06 | Stop reason: SURG

## 2018-08-06 RX ORDER — SODIUM CHLORIDE 0.9 % (FLUSH) 0.9 %
1-10 SYRINGE (ML) INJECTION AS NEEDED
Status: DISCONTINUED | OUTPATIENT
Start: 2018-08-06 | End: 2018-08-06 | Stop reason: HOSPADM

## 2018-08-06 RX ORDER — ONDANSETRON 2 MG/ML
4 INJECTION INTRAMUSCULAR; INTRAVENOUS ONCE AS NEEDED
Status: DISCONTINUED | OUTPATIENT
Start: 2018-08-06 | End: 2018-08-06 | Stop reason: HOSPADM

## 2018-08-06 RX ORDER — LIDOCAINE HYDROCHLORIDE 10 MG/ML
0.5 INJECTION, SOLUTION EPIDURAL; INFILTRATION; INTRACAUDAL; PERINEURAL ONCE AS NEEDED
Status: COMPLETED | OUTPATIENT
Start: 2018-08-06 | End: 2018-08-06

## 2018-08-06 RX ORDER — LIDOCAINE HYDROCHLORIDE 20 MG/ML
INJECTION, SOLUTION INFILTRATION; PERINEURAL AS NEEDED
Status: DISCONTINUED | OUTPATIENT
Start: 2018-08-06 | End: 2018-08-06 | Stop reason: SURG

## 2018-08-06 RX ORDER — SODIUM CHLORIDE, SODIUM LACTATE, POTASSIUM CHLORIDE, CALCIUM CHLORIDE 600; 310; 30; 20 MG/100ML; MG/100ML; MG/100ML; MG/100ML
9 INJECTION, SOLUTION INTRAVENOUS CONTINUOUS
Status: DISCONTINUED | OUTPATIENT
Start: 2018-08-06 | End: 2018-08-06

## 2018-08-06 RX ORDER — SUCCINYLCHOLINE CHLORIDE 20 MG/ML
INJECTION INTRAMUSCULAR; INTRAVENOUS AS NEEDED
Status: DISCONTINUED | OUTPATIENT
Start: 2018-08-06 | End: 2018-08-06 | Stop reason: SURG

## 2018-08-06 RX ORDER — MAGNESIUM HYDROXIDE 1200 MG/15ML
LIQUID ORAL AS NEEDED
Status: DISCONTINUED | OUTPATIENT
Start: 2018-08-06 | End: 2018-08-06 | Stop reason: HOSPADM

## 2018-08-06 RX ORDER — OXYCODONE AND ACETAMINOPHEN 7.5; 325 MG/1; MG/1
1 TABLET ORAL ONCE AS NEEDED
Status: DISCONTINUED | OUTPATIENT
Start: 2018-08-06 | End: 2018-08-06 | Stop reason: HOSPADM

## 2018-08-06 RX ORDER — PROMETHAZINE HYDROCHLORIDE 25 MG/1
25 SUPPOSITORY RECTAL ONCE AS NEEDED
Status: DISCONTINUED | OUTPATIENT
Start: 2018-08-06 | End: 2018-08-06 | Stop reason: HOSPADM

## 2018-08-06 RX ORDER — OXYCODONE HYDROCHLORIDE AND ACETAMINOPHEN 5; 325 MG/1; MG/1
1-2 TABLET ORAL EVERY 4 HOURS PRN
Qty: 30 TABLET | Refills: 0 | Status: SHIPPED | OUTPATIENT
Start: 2018-08-06 | End: 2018-08-13

## 2018-08-06 RX ORDER — SODIUM CHLORIDE 9 MG/ML
40 INJECTION, SOLUTION INTRAVENOUS AS NEEDED
Status: DISCONTINUED | OUTPATIENT
Start: 2018-08-06 | End: 2018-08-06 | Stop reason: HOSPADM

## 2018-08-06 RX ORDER — ROCURONIUM BROMIDE 10 MG/ML
INJECTION, SOLUTION INTRAVENOUS AS NEEDED
Status: DISCONTINUED | OUTPATIENT
Start: 2018-08-06 | End: 2018-08-06 | Stop reason: SURG

## 2018-08-06 RX ORDER — ONDANSETRON 2 MG/ML
6 INJECTION INTRAMUSCULAR; INTRAVENOUS ONCE AS NEEDED
Status: DISCONTINUED | OUTPATIENT
Start: 2018-08-06 | End: 2018-08-06

## 2018-08-06 RX ORDER — PROMETHAZINE HYDROCHLORIDE 25 MG/ML
12.5 INJECTION, SOLUTION INTRAMUSCULAR; INTRAVENOUS ONCE AS NEEDED
Status: DISCONTINUED | OUTPATIENT
Start: 2018-08-06 | End: 2018-08-06 | Stop reason: HOSPADM

## 2018-08-06 RX ORDER — KETOROLAC TROMETHAMINE 30 MG/ML
INJECTION, SOLUTION INTRAMUSCULAR; INTRAVENOUS AS NEEDED
Status: DISCONTINUED | OUTPATIENT
Start: 2018-08-06 | End: 2018-08-06 | Stop reason: SURG

## 2018-08-06 RX ORDER — DEXAMETHASONE SODIUM PHOSPHATE 4 MG/ML
6 INJECTION, SOLUTION INTRA-ARTICULAR; INTRALESIONAL; INTRAMUSCULAR; INTRAVENOUS; SOFT TISSUE ONCE AS NEEDED
Status: COMPLETED | OUTPATIENT
Start: 2018-08-06 | End: 2018-08-06

## 2018-08-06 RX ORDER — MEPERIDINE HYDROCHLORIDE 25 MG/ML
12.5 INJECTION INTRAMUSCULAR; INTRAVENOUS; SUBCUTANEOUS
Status: DISCONTINUED | OUTPATIENT
Start: 2018-08-06 | End: 2018-08-06 | Stop reason: HOSPADM

## 2018-08-06 RX ORDER — PROPOFOL 10 MG/ML
VIAL (ML) INTRAVENOUS AS NEEDED
Status: DISCONTINUED | OUTPATIENT
Start: 2018-08-06 | End: 2018-08-06 | Stop reason: SURG

## 2018-08-06 RX ORDER — DEXAMETHASONE SODIUM PHOSPHATE 4 MG/ML
2 INJECTION, SOLUTION INTRA-ARTICULAR; INTRALESIONAL; INTRAMUSCULAR; INTRAVENOUS; SOFT TISSUE ONCE AS NEEDED
Status: DISCONTINUED | OUTPATIENT
Start: 2018-08-06 | End: 2018-08-06

## 2018-08-06 RX ORDER — ONDANSETRON 2 MG/ML
4 INJECTION INTRAMUSCULAR; INTRAVENOUS ONCE AS NEEDED
Status: COMPLETED | OUTPATIENT
Start: 2018-08-06 | End: 2018-08-06

## 2018-08-06 RX ORDER — MIDAZOLAM HYDROCHLORIDE 1 MG/ML
1 INJECTION INTRAMUSCULAR; INTRAVENOUS
Status: DISCONTINUED | OUTPATIENT
Start: 2018-08-06 | End: 2018-08-06 | Stop reason: HOSPADM

## 2018-08-06 RX ORDER — GLYCOPYRROLATE 0.2 MG/ML
INJECTION INTRAMUSCULAR; INTRAVENOUS AS NEEDED
Status: DISCONTINUED | OUTPATIENT
Start: 2018-08-06 | End: 2018-08-06 | Stop reason: SURG

## 2018-08-06 RX ORDER — MIDAZOLAM HYDROCHLORIDE 1 MG/ML
2 INJECTION INTRAMUSCULAR; INTRAVENOUS
Status: DISCONTINUED | OUTPATIENT
Start: 2018-08-06 | End: 2018-08-06 | Stop reason: HOSPADM

## 2018-08-06 RX ORDER — PROMETHAZINE HYDROCHLORIDE 25 MG/1
25 TABLET ORAL ONCE AS NEEDED
Status: DISCONTINUED | OUTPATIENT
Start: 2018-08-06 | End: 2018-08-06 | Stop reason: HOSPADM

## 2018-08-06 RX ORDER — LIDOCAINE HYDROCHLORIDE 10 MG/ML
0.5 INJECTION, SOLUTION EPIDURAL; INFILTRATION; INTRACAUDAL; PERINEURAL ONCE AS NEEDED
Status: DISCONTINUED | OUTPATIENT
Start: 2018-08-06 | End: 2018-08-06 | Stop reason: HOSPADM

## 2018-08-06 RX ADMIN — SUCCINYLCHOLINE CHLORIDE 30 MG: 20 INJECTION, SOLUTION INTRAMUSCULAR; INTRAVENOUS at 09:27

## 2018-08-06 RX ADMIN — METRONIDAZOLE 500 MG: 500 INJECTION, SOLUTION INTRAVENOUS at 09:25

## 2018-08-06 RX ADMIN — PROPOFOL 150 MG: 10 INJECTION, EMULSION INTRAVENOUS at 09:25

## 2018-08-06 RX ADMIN — ROCURONIUM BROMIDE 5 MG: 10 INJECTION INTRAVENOUS at 09:25

## 2018-08-06 RX ADMIN — LIDOCAINE HYDROCHLORIDE 100 MG: 20 INJECTION, SOLUTION INFILTRATION; PERINEURAL at 09:25

## 2018-08-06 RX ADMIN — SODIUM CHLORIDE, POTASSIUM CHLORIDE, SODIUM LACTATE AND CALCIUM CHLORIDE: 600; 310; 30; 20 INJECTION, SOLUTION INTRAVENOUS at 08:49

## 2018-08-06 RX ADMIN — DEXAMETHASONE SODIUM PHOSPHATE 6 MG: 4 INJECTION, SOLUTION INTRAMUSCULAR; INTRAVENOUS at 08:53

## 2018-08-06 RX ADMIN — METRONIDAZOLE 500 MG: 500 INJECTION, SOLUTION INTRAVENOUS at 08:53

## 2018-08-06 RX ADMIN — ONDANSETRON 4 MG: 2 INJECTION, SOLUTION INTRAMUSCULAR; INTRAVENOUS at 08:54

## 2018-08-06 RX ADMIN — GLYCOPYRROLATE 0.2 MG: 0.2 INJECTION INTRAMUSCULAR; INTRAVENOUS at 09:46

## 2018-08-06 RX ADMIN — SODIUM CHLORIDE, POTASSIUM CHLORIDE, SODIUM LACTATE AND CALCIUM CHLORIDE 9 ML/HR: 600; 310; 30; 20 INJECTION, SOLUTION INTRAVENOUS at 08:20

## 2018-08-06 RX ADMIN — KETOROLAC TROMETHAMINE 30 MG: 30 INJECTION INTRAMUSCULAR; INTRAVENOUS at 09:46

## 2018-08-06 RX ADMIN — MIDAZOLAM HYDROCHLORIDE 1 MG: 1 INJECTION, SOLUTION INTRAMUSCULAR; INTRAVENOUS at 08:54

## 2018-08-06 RX ADMIN — FENTANYL CITRATE 50 MCG: 50 INJECTION, SOLUTION INTRAMUSCULAR; INTRAVENOUS at 09:25

## 2018-08-06 RX ADMIN — SUCCINYLCHOLINE CHLORIDE 100 MG: 20 INJECTION, SOLUTION INTRAMUSCULAR; INTRAVENOUS at 09:25

## 2018-08-06 RX ADMIN — SUGAMMADEX 200 MG: 100 INJECTION, SOLUTION INTRAVENOUS at 09:47

## 2018-08-06 RX ADMIN — LIDOCAINE HYDROCHLORIDE 0.1 ML: 10 INJECTION, SOLUTION EPIDURAL; INFILTRATION; INTRACAUDAL; PERINEURAL at 08:20

## 2018-08-06 RX ADMIN — FENTANYL CITRATE 50 MCG: 50 INJECTION, SOLUTION INTRAMUSCULAR; INTRAVENOUS at 09:35

## 2018-08-06 NOTE — H&P (VIEW-ONLY)
PATIENT INFORMATION  Skylar Godinez  F/U FISTULA-IN-ANO, FINISHED ABX TODAY, AREA IS ABOUT THE SAME     - 1955    CHIEF COMPLAINT  Chief Complaint   Patient presents with   • Follow-up       HISTORY OF PRESENT ILLNESS  HPI she has completed her antibiotics and still has the drainage.  She's complaints of some swelling in the area.  She denies any fevers or chills.  She has been doing the sitz baths.        REVIEW OF SYSTEMS  Review of Systems otherwise negative      ACTIVE PROBLEMS  Patient Active Problem List    Diagnosis   • Family history of breast cancer [Z80.3]   • Breast pain, left [N64.4]   • Acute anal fissure [K60.0]   • Dyspareunia in female [N94.10]   • Lichen sclerosus et atrophicus of the vulva [N90.4]   • Anemia [D64.9]   • Gastroesophageal reflux disease [K21.9]   • Hyperlipidemia [E78.5]         PAST MEDICAL HISTORY  Past Medical History:   Diagnosis Date   • Lichen sclerosus of female genitalia          SURGICAL HISTORY  Past Surgical History:   Procedure Laterality Date   • ADENOIDECTOMY     • ANAL FISTULA REPAIR     • SPLENECTOMY           FAMILY HISTORY  Family History   Problem Relation Age of Onset   • Prostate cancer Father    • Breast cancer Mother    • Hypertension Mother    • Hypertension Paternal Grandfather    • Brain cancer Paternal Grandmother    • Melanoma Maternal Grandmother    • Hypertension Maternal Grandfather    • Breast cancer Maternal Aunt          SOCIAL HISTORY  Social History     Occupational History   • Not on file.     Social History Main Topics   • Smoking status: Never Smoker   • Smokeless tobacco: Never Used   • Alcohol use Yes      Comment: occ   • Drug use: No   • Sexual activity: Yes     Partners: Male         CURRENT MEDICATIONS    Current Outpatient Prescriptions:   •  clobetasol (TEMOVATE) 0.05 % cream, 2 (Two) Times a Day., Disp: , Rfl:   •  famotidine (PEPCID) 10 MG tablet, Take 10 mg by mouth 2 (Two) Times a Day., Disp: , Rfl:   •  ketoconazole  "(NIZORAL) 2 % cream, ketoconazole 2 % topical cream  APPLY TO THE AFFECTED AREA(S) BY TOPICAL ROUTE TWO TIMES A DAY, Disp: , Rfl:   •  Loratadine (CLARITIN) 10 MG capsule, Take  by mouth., Disp: , Rfl:   •  PROCTO-MED HC 2.5 % rectal cream, APPLY TO AFFECTED AREA(S)  RECTALLY TWO TIMES A DAY, Disp: 30 g, Rfl: 0    ALLERGIES  Sulfa antibiotics    VITALS  Vitals:    07/30/18 1320   BP: 122/72   Weight: 68 kg (150 lb)   Height: 160 cm (63\")       LAST RESULTS   Office Visit on 07/10/2018   Component Date Value Ref Range Status   • Color 07/10/2018 Yellow  Yellow, Straw, Dark Yellow, Elinor Final   • Clarity, UA 07/10/2018 Clear  Clear Final   • Glucose, UA 07/10/2018 Negative  Negative, 1000 mg/dL (3+) mg/dL Final   • Bilirubin 07/10/2018 Negative  Negative Final   • Ketones, UA 07/10/2018 Negative  Negative Final   • Specific Gravity  07/10/2018 1.005  1.005 - 1.030 Final   • Blood, UA 07/10/2018 Negative  Negative Final   • pH, Urine 07/10/2018 5.0  5.0 - 8.0 Final   • Protein, POC 07/10/2018 Negative  Negative mg/dL Final   • Urobilinogen, UA 07/10/2018 Normal  Normal Final   • Leukocytes 07/10/2018 Negative  Negative Final   • Nitrite, UA 07/10/2018 Negative  Negative Final   • Diagnosis 07/10/2018 Comment   Final    Comment: NEGATIVE FOR INTRAEPITHELIAL LESION AND MALIGNANCY.  THIS SPECIMEN WAS RESCREENED AS PART OF OUR  PROGRAM.     • Specimen adequacy: 07/10/2018 Comment   Final    Comment: Satisfactory for evaluation.  Endocervical and/or squamous metaplastic  cells (endocervical component) are present.     • Clinician Provided ICD-10: 07/10/2018 Comment   Final    Z01.419   • Performed by: 07/10/2018 Comment   Final    Johanne Sky, Cytotechnologist (ASC)   • QC reviewed by: 07/10/2018 Comment   Final    Arely Brooks, Supervisory Cytotechnologist (ASCP)   • . 07/10/2018 .   Final   • Note: 07/10/2018 Comment   Final    Comment: The Pap smear is a screening test designed to aid in the detection " of  premalignant and malignant conditions of the uterine cervix.  It is not a  diagnostic procedure and should not be used as the sole means of detecting  cervical cancer.  Both false-positive and false-negative reports do occur.     • Method: 07/10/2018 Comment   Final    Comment: This liquid based ThinPrep(R) pap test was screened with the  use of an image guided system.     • HPV, high-risk 07/10/2018 CANCELED   Final-Edited    Comment: The quantity of specimen remaining in the vial after Pap slide  preparation was less than the 4 mL minimum cell suspension required.  Low sample cellularity may be the cause.  See HPV, low volume rfx  test result.  This high-risk HPV test detects thirteen high-risk types  (16/18/31/33/35/39/45/51/52/56/58/59/68) without differentiation.    Result canceled by the ancillary     • HPV, low volume rfx 07/10/2018 Negative  Negative Final    Comment: This test detects fourteen high-risk HPV types (16,18,31,33,35,39,45,  51,52,56,58,59,66,68) without differentiation.       No results found.    PHYSICAL EXAM  Physical Exam alert white female in no active distress.  Her heart shows regular rate and rhythm her lungs are clear and equal she has the perianal sinus opening with mild induration but no real abscess.  This is unchanged from before.    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  We discussed going to surgery doing a fistulotomy versus seton versus an opinion from a colorectal surgeon.  She wants to think things over and get back to me.  Patient called back and wants to proceed with surgery.  This has been set up for [facility] on August 6

## 2018-08-06 NOTE — ANESTHESIA POSTPROCEDURE EVALUATION
Patient: Skylar Godinez    Procedure Summary     Date:  08/06/18 Room / Location:  [facility] OR 1 /  [facility] OR    Anesthesia Start:  0924 Anesthesia Stop:  1000    Procedure:  Rigid Sigmoidoscopy, fistulotomy, possible seton (N/A Rectum) Diagnosis:       Fistula-in-ano      (Fistula-in-ano [K60.3])    Surgeon:  Nestor Mai MD Provider:  Pinky Coffman CRNA    Anesthesia Type:  general ASA Status:  2          Anesthesia Type: general  Last vitals  BP   121/93 (08/06/18 1035)   Temp   97.8 °F (36.6 °C) (08/06/18 1035)   Pulse   78 (08/06/18 1035)   Resp   16 (08/06/18 1035)     SpO2   95 % (08/06/18 1035)     Post Anesthesia Care and Evaluation    Patient location during evaluation: PHASE II  Patient participation: complete - patient participated  Level of consciousness: awake and alert  Pain score: 0  Pain management: adequate  Airway patency: patent  Anesthetic complications: No anesthetic complications  PONV Status: none  Cardiovascular status: acceptable  Respiratory status: acceptable  Hydration status: acceptable

## 2018-08-06 NOTE — OP NOTE
Preoperative diagnosis fistula in ano  Postoperative diagnosis is same  Procedure rigid sigmoidoscopy, fistulotomy  Complications none  Drains none  Specimen none  Estimated blood loss less than 5 cc  Anesthesia Gen. via endotracheal tube  Surgeon Dr. Mai  Findings normal endoscopy to 8 cm, fistula in ano superficial to the sphincter mechanism, no evidence of inflammatory bowel disease  Procedure after satisfactory induction of general anesthesia via endotracheal tube the patient was ginger prone jackknife on the operating room table.  Rigid sigmoidoscopy was performed to 8 cm was terminated at that point secondary to stool.  There was no evidence of the inflammatory bowel disease or mass.  Her anal area was prepped and draped sterile fashion she had a sinus tract at the 6 o'clock position.  The anus was dilated to a small Hill-Raymond retractor a medium Hill-Raymond retractor could not be inserted secondary to some mild anal stenosis.  Flexible probe was passed into the tract easily and easily went in to the rectum.  This went directly in and it appeared to be superficial to the sphincter mechanism.  The skin and subcutaneous tissue was divided over the probe with use of electrocautery.  The base of the fistula was cauterized as well hemostasis was assured.  Gelfoam roll was placed within the anus and wound was clean and dry and sterilely dressed.  The patient tolerated the procedure well was transferred to the recovery room in stable condition which is awake alert stable tolerating by mouth and has voided she'll be discharged home with follow-up in my office next week call for problems.  In the a.m. she needs to start 3 times a day sitz baths.  Diet as tolerated.  Continue prehospitalization medications.  She was written a prescription for Percocet 5/325 one to 2 by mouth every 4 hours when necessary pain 30 of these were dispensed without refills.

## 2018-08-06 NOTE — ANESTHESIA PREPROCEDURE EVALUATION
Anesthesia Evaluation     Patient summary reviewed and Nursing notes reviewed   NPO Solid Status: > 8 hours  NPO Liquid Status: > 2 hours           Airway   Mallampati: III  TM distance: >3 FB  Neck ROM: full  No difficulty expected  Dental - normal exam     Pulmonary - negative pulmonary ROS and normal exam    breath sounds clear to auscultation  Cardiovascular - normal exam    ECG reviewed    (+) hyperlipidemia,       Neuro/Psych  GI/Hepatic/Renal/Endo    (+)  GERD well controlled,      Musculoskeletal     Abdominal  - normal exam   Substance History      OB/GYN          Other   (+) blood dyscrasia (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  Use of blood products discussed with consented to blood products.

## 2018-08-06 NOTE — ANESTHESIA PROCEDURE NOTES
Airway  Urgency: elective    Airway not difficult    General Information and Staff    Patient location during procedure: OR  CRNA: DAR MONTALVO    Indications and Patient Condition  Indications for airway management: airway protection    Preoxygenated: yes  MILS maintained throughout  Mask difficulty assessment: 1 - vent by mask    Final Airway Details  Final airway type: endotracheal airway      Successful airway: ETT  Cuffed: yes   Successful intubation technique: direct laryngoscopy  Facilitating devices/methods: intubating stylet  Endotracheal tube insertion site: oral  Blade: Oli  Blade size: #3  Cormack-Lehane Classification: grade I - full view of glottis  Placement verified by: chest auscultation and capnometry   Cuff volume (mL): 8  Measured from: lips  ETT to lips (cm): 20  Number of attempts at approach: 1

## 2018-08-13 ENCOUNTER — OFFICE VISIT (OUTPATIENT)
Dept: SURGERY | Facility: CLINIC | Age: 63
End: 2018-08-13

## 2018-08-13 DIAGNOSIS — Z09 SURGICAL FOLLOW-UP CARE: Primary | ICD-10-CM

## 2018-08-13 PROCEDURE — 99024 POSTOP FOLLOW-UP VISIT: CPT | Performed by: SURGERY

## 2018-08-13 NOTE — PROGRESS NOTES
1 wk s/p rectal fistulotomy, no complaints, finished ABX she feels well she is continent.  She denies any fevers or chills.  Her fistulotomy site is clean and granulating and healing.  I will see her back in 2 weeks.

## 2018-08-27 ENCOUNTER — OFFICE VISIT (OUTPATIENT)
Dept: SURGERY | Facility: CLINIC | Age: 63
End: 2018-08-27

## 2018-08-27 DIAGNOSIS — Z09 SURGICAL FOLLOW-UP CARE: Primary | ICD-10-CM

## 2018-08-27 PROCEDURE — 99024 POSTOP FOLLOW-UP VISIT: CPT | Performed by: SURGERY

## 2018-08-27 NOTE — PROGRESS NOTES
3 week s/p fistulotomy.  She is without complaints.  She is doing the sitz baths mostly 3 times a day sometimes twice a day.  She denies any problems from the site.  The fistulotomy site has nearly healed.  She is continent for flatus and stool.  She can decrease her sitz baths to daily for the next 2 weeks then discontinue them.  I will see her back when necessary.

## 2019-07-30 ENCOUNTER — OFFICE VISIT (OUTPATIENT)
Dept: OBSTETRICS AND GYNECOLOGY | Facility: CLINIC | Age: 64
End: 2019-07-30

## 2019-07-30 VITALS
DIASTOLIC BLOOD PRESSURE: 78 MMHG | BODY MASS INDEX: 26.22 KG/M2 | HEIGHT: 63 IN | SYSTOLIC BLOOD PRESSURE: 114 MMHG | WEIGHT: 148 LBS

## 2019-07-30 DIAGNOSIS — Z01.419 WELL FEMALE EXAM WITH ROUTINE GYNECOLOGICAL EXAM: Primary | ICD-10-CM

## 2019-07-30 DIAGNOSIS — M85.80 OSTEOPENIA, SENILE: ICD-10-CM

## 2019-07-30 LAB
BILIRUB BLD-MCNC: NEGATIVE MG/DL
CLARITY, POC: CLEAR
COLOR UR: YELLOW
GLUCOSE UR STRIP-MCNC: NEGATIVE MG/DL
KETONES UR QL: NEGATIVE
LEUKOCYTE EST, POC: NEGATIVE
NITRITE UR-MCNC: NEGATIVE MG/ML
PH UR: 6 [PH] (ref 5–8)
PROT UR STRIP-MCNC: NEGATIVE MG/DL
RBC # UR STRIP: NEGATIVE /UL
SP GR UR: 1.02 (ref 1–1.03)
UROBILINOGEN UR QL: NORMAL

## 2019-07-30 PROCEDURE — 81002 URINALYSIS NONAUTO W/O SCOPE: CPT | Performed by: OBSTETRICS & GYNECOLOGY

## 2019-07-30 PROCEDURE — 99396 PREV VISIT EST AGE 40-64: CPT | Performed by: OBSTETRICS & GYNECOLOGY

## 2022-03-31 RX ORDER — CLOBETASOL PROPIONATE 0.5 MG/G
1 CREAM TOPICAL 2 TIMES DAILY PRN
Qty: 45 G | Refills: 0 | Status: SHIPPED | OUTPATIENT
Start: 2022-03-31

## 2022-03-31 NOTE — TELEPHONE ENCOUNTER
Caller: Skylar Godinez    Relationship: Self    Best call back number: [phone]    Requested Prescriptions:   Requested Prescriptions     Pending Prescriptions Disp Refills   • clobetasol (TEMOVATE) 0.05 % cream       Sig: Apply 1 application topically to the appropriate area as directed 2 (Two) Times a Day As Needed.        Pharmacy where request should be sent:  [facility] IN [place], [place]    Additional details provided by patient: PT HAS LESS THAN 1 DOSE LEFT. THE TUBE IS BASICALLY EMPTY. IF PT HAS A FLARE UP, THEY WON'T HAVE ENOUGH. PT'S NEXT APPT IS 6/10TH.    PT ALSO NEEDS A NEW ORDER FOR A DEXA SCAN SENT TO [facility] -[phone], PT HAS A MAMMOGRAM SCHEDULED THERE 4/25/22 AND WANTS TO DO THE DEXA AT THE SAME TIME.    PLEASE CALL PT BACK TO LET THEM KNOW IF THE MEDS WILL BE REFILLED OR NOT, AND TO LET THEM KNOW WHEN THE DEXA ORDER HAS BEEN SENT.      Does the patient have less than a 3 day supply:  [x] Yes  [] No    Michelle Gross Rep   03/31/22 09:17 EDT

## 2022-04-12 ENCOUNTER — TELEPHONE (OUTPATIENT)
Dept: OBSTETRICS AND GYNECOLOGY | Facility: CLINIC | Age: 67
End: 2022-04-12

## 2022-04-12 NOTE — TELEPHONE ENCOUNTER
Caller:     Relationship: [facility].     Best call back number: [phone]    What form or medical record are you requesting: ORDER FOR DEXA (BONE DENSITY)    Who is requesting this form or medical record from you: [facility].     How would you like to receive the form or medical records (pick-up, mail, fax): FAX  If fax, what is the fax number: [phone]    Timeframe paperwork needed: SCHED FOR 4/25/22

## 2022-04-13 ENCOUNTER — TELEPHONE (OUTPATIENT)
Dept: OBSTETRICS AND GYNECOLOGY | Facility: CLINIC | Age: 67
End: 2022-04-13

## 2022-04-13 DIAGNOSIS — Z78.0 POSTMENOPAUSAL: Primary | ICD-10-CM

## 2022-04-13 NOTE — TELEPHONE ENCOUNTER
[facility] called and is requesting a DEXA order be faxed to them. I will fax when order is placed, thanks!

## 2022-08-01 ENCOUNTER — OFFICE VISIT (OUTPATIENT)
Dept: OBSTETRICS AND GYNECOLOGY | Facility: CLINIC | Age: 67
End: 2022-08-01

## 2022-08-01 VITALS
DIASTOLIC BLOOD PRESSURE: 72 MMHG | BODY MASS INDEX: 24.46 KG/M2 | HEIGHT: 65 IN | SYSTOLIC BLOOD PRESSURE: 114 MMHG | WEIGHT: 146.8 LBS

## 2022-08-01 DIAGNOSIS — Z01.419 ROUTINE GYNECOLOGICAL EXAMINATION: Primary | ICD-10-CM

## 2022-08-01 DIAGNOSIS — Z01.419 PAP SMEAR, LOW-RISK: ICD-10-CM

## 2022-08-01 PROCEDURE — 99387 INIT PM E/M NEW PAT 65+ YRS: CPT | Performed by: OBSTETRICS & GYNECOLOGY

## 2022-08-01 PROCEDURE — 81002 URINALYSIS NONAUTO W/O SCOPE: CPT | Performed by: OBSTETRICS & GYNECOLOGY

## 2022-08-01 RX ORDER — CALCIUM CARBONATE 750 MG/1
TABLET, CHEWABLE ORAL
COMMUNITY
Start: 2017-08-10

## 2022-08-01 RX ORDER — FAMOTIDINE 20 MG
TABLET ORAL
COMMUNITY
Start: 2017-08-10

## 2022-08-01 NOTE — PROGRESS NOTES
GYN Annual Exam     CC- Here for annual exam.     Skylar Godinez is a 66 y.o. female who presents for annual well woman exam. Last seen 2019. Menopause age 54. No PMPB. No vasomotor symptoms. No hx of HRT. Her   2022. Her MMG is UTD. She had a bone density this year revealing osteoporosis of the spine.      OB History        3    Para   2    Term   2            AB   1    Living   2       SAB   1    IAB        Ectopic        Molar        Multiple        Live Births                       Current contraception: none  History of abnormal Pap smear: no  History of abnormal mammogram: yes - hx of breast biopsy/excision benign.  Family history of uterine, colon or ovarian cancer: yes - paternal aunt with a gynecoloic cancer but pt is unsure if it is ovarian or not?  Family history of breast cancer: yes - mother dx age 58. Maternal aunt dx in 70's.           Health Maintenance   Topic Date Due   • COLORECTAL CANCER SCREENING  Never done   • TDAP/TD VACCINES (1 - Tdap) Never done   • HEPATITIS C SCREENING  Never done   • ANNUAL WELLNESS VISIT  Never done   • LIPID PANEL  Never done   • Pneumococcal Vaccine 65+ (1 - PCV) Never done   • PAP SMEAR  07/10/2021   • INFLUENZA VACCINE  10/01/2022   • MAMMOGRAM  2024   • DXA SCAN  2024   • COVID-19 Vaccine  Completed   • ZOSTER VACCINE  Completed       Past Medical History:   Diagnosis Date   • Anal fistula     SCHEDULED FOR REPAIR   • Arthritis     KNEES   • GERD (gastroesophageal reflux disease)    • Hyperlipidemia    • Lichen sclerosus of female genitalia    • Spherocytosis (HCC)     SPLEEN REMOVED       Past Surgical History:   Procedure Laterality Date   • ANAL FISTULA REPAIR  2013   • APPENDECTOMY     • COLONOSCOPY     • RECTAL FISTULOTOMY N/A 2018    Procedure: Rigid Sigmoidoscopy, fistulotomy, possible seton;  Surgeon: Nestor Mai MD;  Location: [facility] OR;  Service: General   • SPLENECTOMY      13 YRS OLD  "        Current Outpatient Medications:   •  calcium carbonate EX (Tums Chewy Bites) 750 MG chewable tablet, , Disp: , Rfl:   •  Vitamin D, Cholecalciferol, 25 MCG (1000 UT) capsule, , Disp: , Rfl:   •  clobetasol (TEMOVATE) 0.05 % cream, Apply 1 application topically to the appropriate area as directed 2 (Two) Times a Day As Needed (itching)., Disp: 45 g, Rfl: 0  •  famotidine (PEPCID) 10 MG tablet, Take 10 mg by mouth 2 (Two) Times a Day., Disp: , Rfl:   •  Loratadine (CLARITIN) 10 MG capsule, Take 10 mg by mouth Every Morning., Disp: , Rfl:   •  mupirocin (BACTROBAN) 2 % ointment, mupirocin 2 % topical ointment  APPLY A SMALL AMOUNT TO THE AFFECTED AREA BY TOPICAL ROUTE 3 TIMES PER DAY, Disp: , Rfl:     Allergies   Allergen Reactions   • Sulfa Antibiotics Other (See Comments)     Caused bleeding        Social History     Tobacco Use   • Smoking status: Former Smoker     Packs/day: 0.25     Years: 3.00     Pack years: 0.75     Quit date:      Years since quittin.6   • Smokeless tobacco: Never Used   Substance Use Topics   • Alcohol use: Yes     Comment: occ   • Drug use: No       Family History   Problem Relation Age of Onset   • Prostate cancer Father    • Breast cancer Mother    • Hypertension Mother    • Hypertension Paternal Grandfather    • Brain cancer Paternal Grandmother    • Melanoma Maternal Grandmother    • Hypertension Maternal Grandfather    • Breast cancer Maternal Aunt        Review of Systems   Constitutional: Negative for unexpected weight change.   Gastrointestinal: Negative for abdominal distention, abdominal pain, anal bleeding, blood in stool, constipation, diarrhea and rectal pain.   Genitourinary: Negative for dyspareunia, menstrual problem, pelvic pain, vaginal bleeding, vaginal discharge and vaginal pain.       /72   Ht 165.1 cm (65\")   Wt 66.6 kg (146 lb 12.8 oz)   Breastfeeding No   BMI 24.43 kg/m²     Physical Exam   Constitutional: She is oriented to person, place, " and time. She appears well-developed.   HENT:   Mouth/Throat: Normal dentition. No dental caries.   Cardiovascular: Normal rate and regular rhythm.   Pulmonary/Chest: Effort normal and breath sounds normal. Right breast exhibits no inverted nipple, no mass, no nipple discharge, no skin change and no tenderness. Left breast exhibits no inverted nipple, no mass, no nipple discharge, no skin change and no tenderness.   Abdominal: Soft. She exhibits no distension and no mass. There is no abdominal tenderness.   Genitourinary: There is no rash, tenderness or lesion on the right labia. There is no rash, tenderness or lesion on the left labia. Uterus is not deviated, not enlarged, not fixed and not tender. Cervix exhibits no motion tenderness, no discharge and no friability. Right adnexum displays no mass, no tenderness and no fullness. Left adnexum displays no mass, no tenderness and no fullness.    No vaginal discharge, tenderness or bleeding.   No tenderness or bleeding in the vagina.   Neurological: She is alert and oriented to person, place, and time.   Psychiatric: Her behavior is normal. Judgment and thought content normal.   Vitals reviewed.         Assessment/Plan    1) GYN HM: Check pap smear. MMG UTD 2022  Colonosocpy done - no polyps.  2) : No PMPB. No vasomotor sx.   3) Bone health - Weight bearing exercise, dietary calcium recommendations and vitamin D reviewed. Bone density done 2022 revealed osteoporosis: pt declines meds. Check bone density in 2 years.  4) Hx of anal fistula: s/p repair   5) Smoking Status: non smoker  6) Suspected lichen sclerosis: Pt reports that I performed a biopsy on the area several years ago and it was benign. Discussed using Clobetasol x 2 weeks qhs for flares. Pt has been doing well and not needing the cream.  7) Social:   from prostate cancer in 2022. [facility]  9) Follow up prn and 1 year       Diagnoses and all orders for this  visit:    Routine gynecological examination  -     POC Urinalysis Dipstick    Pap smear, low-risk  -     Pap IG (Image Guided)    Other orders  -     calcium carbonate EX (Tums Chewy Bites) 750 MG chewable tablet  -     Vitamin D, Cholecalciferol, 25 MCG (1000 UT) capsule  -     mupirocin (BACTROBAN) 2 % ointment; mupirocin 2 % topical ointment   APPLY A SMALL AMOUNT TO THE AFFECTED AREA BY TOPICAL ROUTE 3 TIMES PER DAY        Isha Ayon DO  8/1/2022  11:57 EDT

## 2022-08-04 LAB
CYTOLOGIST CVX/VAG CYTO: NORMAL
CYTOLOGY CVX/VAG DOC CYTO: NORMAL
CYTOLOGY CVX/VAG DOC THIN PREP: NORMAL
DX ICD CODE: NORMAL
HIV 1 & 2 AB SER-IMP: NORMAL
OTHER STN SPEC: NORMAL
STAT OF ADQ CVX/VAG CYTO-IMP: NORMAL

## 2022-08-05 ENCOUNTER — TELEPHONE (OUTPATIENT)
Dept: OBSTETRICS AND GYNECOLOGY | Facility: CLINIC | Age: 67
End: 2022-08-05

## 2022-08-05 NOTE — TELEPHONE ENCOUNTER
UNABLE TO WARM TRANSFER    Caller: Skylar Godinez    Relationship to patient: Self    Best call back number: [phone]    Patient is needing: PT WAS RETURNING A MISSED CALL FROM THE OFFICE

## 2022-09-19 ENCOUNTER — TELEPHONE (OUTPATIENT)
Dept: SURGERY | Facility: CLINIC | Age: 67
End: 2022-09-19

## 2023-08-09 ENCOUNTER — OFFICE VISIT (OUTPATIENT)
Dept: OBSTETRICS AND GYNECOLOGY | Facility: CLINIC | Age: 68
End: 2023-08-09
Payer: MEDICARE

## 2023-08-09 VITALS
HEIGHT: 65 IN | BODY MASS INDEX: 24.32 KG/M2 | WEIGHT: 146 LBS | DIASTOLIC BLOOD PRESSURE: 78 MMHG | SYSTOLIC BLOOD PRESSURE: 124 MMHG

## 2023-08-09 DIAGNOSIS — Z12.31 ENCOUNTER FOR SCREENING MAMMOGRAM FOR MALIGNANT NEOPLASM OF BREAST: ICD-10-CM

## 2023-08-09 DIAGNOSIS — Z01.419 ROUTINE GYNECOLOGICAL EXAMINATION: Primary | ICD-10-CM

## 2023-08-09 DIAGNOSIS — Z12.11 SCREENING FOR COLON CANCER: ICD-10-CM

## 2023-08-09 NOTE — PROGRESS NOTES
GYN Annual Exam     CC- Here for annual exam.     Skylar Godinez is a 67 y.o. female who presents for annual well woman exam. Last seen 2019. Menopause age 54. No PMPB. No vasomotor symptoms. She is not taking any  HRT. Her   2022. Her MMG is UTD. She had a bone density  revealing osteoporosis of the spine.    She has lichen sclerosis and has clobetasol for PRN use.       OB History          3    Para   2    Term   2            AB   1    Living   2         SAB   1    IAB        Ectopic        Molar        Multiple        Live Births                    MM/22  Pap:   Dexa:    Colonoscopy:   Current contraception: none  History of abnormal Pap smear: no  History of abnormal mammogram: yes - hx of breast biopsy/excision benign.  Family history of uterine, colon or ovarian cancer: yes - paternal aunt with a gynecoloic cancer but pt is unsure if it is ovarian or not?  Family history of breast cancer: yes - mother dx age 58. Maternal aunt dx in 70's.           Health Maintenance   Topic Date Due    COLORECTAL CANCER SCREENING  Never done    TDAP/TD VACCINES (1 - Tdap) Never done    HEPATITIS C SCREENING  Never done    ANNUAL WELLNESS VISIT  Never done    LIPID PANEL  Never done    Pneumococcal Vaccine 65+ (1 - PCV) Never done    COVID-19 Vaccine (6 - Moderna series) 2023    INFLUENZA VACCINE  10/01/2023    MAMMOGRAM  2024    DXA SCAN  2024    PAP SMEAR  2025    ZOSTER VACCINE  Completed       Past Medical History:   Diagnosis Date    Anal fistula     SCHEDULED FOR REPAIR    Arthritis     KNEES    GERD (gastroesophageal reflux disease)     Hyperlipidemia     Lichen sclerosus of female genitalia     Spherocytosis     SPLEEN REMOVED       Past Surgical History:   Procedure Laterality Date    ANAL FISTULA REPAIR  2013    APPENDECTOMY      COLONOSCOPY      RECTAL FISTULOTOMY N/A 2018    Procedure: Rigid Sigmoidoscopy, fistulotomy, possible  marlyn;  Surgeon: Nestor Mai MD;  Location: [facility];  Service: General    SPLENECTOMY      13 YRS OLD         Current Outpatient Medications:     calcium carbonate EX (Tums Chewy Bites) 750 MG chewable tablet, , Disp: , Rfl:     clobetasol (TEMOVATE) 0.05 % cream, Apply 1 application topically to the appropriate area as directed 2 (Two) Times a Day As Needed (itching)., Disp: 45 g, Rfl: 0    famotidine (PEPCID) 10 MG tablet, Take 10 mg by mouth 2 (Two) Times a Day., Disp: , Rfl:     Loratadine (CLARITIN) 10 MG capsule, Take 10 mg by mouth Every Morning., Disp: , Rfl:     mupirocin (BACTROBAN) 2 % ointment, mupirocin 2 % topical ointment  APPLY A SMALL AMOUNT TO THE AFFECTED AREA BY TOPICAL ROUTE 3 TIMES PER DAY, Disp: , Rfl:     Vitamin D, Cholecalciferol, 25 MCG (1000 UT) capsule, , Disp: , Rfl:     Allergies   Allergen Reactions    Sulfa Antibiotics Other (See Comments)     Caused bleeding        Social History     Tobacco Use    Smoking status: Former     Packs/day: 0.25     Years: 3.00     Pack years: 0.75     Types: Cigarettes     Quit date:      Years since quittin.6    Smokeless tobacco: Never   Substance Use Topics    Alcohol use: Yes     Comment: occ    Drug use: No       Family History   Problem Relation Age of Onset    Prostate cancer Father     Breast cancer Mother     Hypertension Mother     Hypertension Paternal Grandfather     Brain cancer Paternal Grandmother     Melanoma Maternal Grandmother     Hypertension Maternal Grandfather     Breast cancer Maternal Aunt        Review of Systems   Constitutional: Negative.  Negative for unexpected weight change.   Gastrointestinal: Negative.  Negative for abdominal distention, abdominal pain, anal bleeding, blood in stool, constipation, diarrhea and rectal pain.   Genitourinary: Negative.  Negative for dyspareunia, menstrual problem, pelvic pain, vaginal bleeding, vaginal discharge and vaginal pain.        Occas has vaginal itching       BP  "124/78   Ht 165.1 cm (65\")   Wt 66.2 kg (146 lb)   BMI 24.30 kg/mý     Physical Exam   Constitutional: She is oriented to person, place, and time. She appears well-developed.   HENT:   Mouth/Throat: Normal dentition. No dental caries.   Cardiovascular: Normal rate and regular rhythm.   Pulmonary/Chest: Effort normal and breath sounds normal. Right breast exhibits no inverted nipple, no mass, no nipple discharge, no skin change and no tenderness. Left breast exhibits no inverted nipple, no mass, no nipple discharge, no skin change and no tenderness.   Abdominal: Soft. She exhibits no distension and no mass. There is no abdominal tenderness.   Genitourinary: There is no rash, tenderness or lesion on the right labia. There is no rash, tenderness or lesion on the left labia. Uterus is not deviated, not enlarged, not fixed and not tender. Cervix exhibits no motion tenderness, no discharge and no friability. Right adnexum displays no mass, no tenderness and no fullness. Left adnexum displays no mass, no tenderness and no fullness.    No vaginal discharge, tenderness or bleeding.   No tenderness or bleeding in the vagina.   Neurological: She is alert and oriented to person, place, and time.   Psychiatric: Her behavior is normal. Judgment and thought content normal.   Vitals reviewed.       Assessment/Plan    1) GYN HM: Check pap smear next year.  MMG UTD 4/2022  Schedule screening MMG.  2) : No PMPB. No vasomotor sx. Does have atrophy. Disc OTC options of extra virgin olive oil, coconut oil or [place]e. Also disc RX of estradiol cream or Intrarosa.   3) Bone health - Weight bearing exercise, dietary calcium recommendations and vitamin D reviewed. Bone density done 5/2022 revealed osteoporosis: pt declines meds. Check bone density in 2024 years.  4) Hx of anal fistula: s/p repair   5) Smoking Status: non smoker  6) Lichen sclerosis: No s/s currently. Has clobetasol for PRN use.   7) Colonosocpy done 2010- no polyps. " Declines GI ref. Accepts cologuard, erx placed.   8) Body mass index is 24.3 kg/mý.     Follow up prn and 1 year       Diagnoses and all orders for this visit:    Routine gynecological examination  -     POC Urinalysis Dipstick    Screening for colon cancer  -     Cologuard - Stool, Per Rectum; Future    Encounter for screening mammogram for malignant neoplasm of breast  -     Mammo Screening Bilateral With CAD; Future    Parts of this document have been copied or forwarded from her previous visits and have been reviewed, updated and edited as indicated.       Elisabeth Puri, APRN  8/9/2023  12:54 EDT

## (undated) DEVICE — SUT SILK 2/0 TIES 18IN A185H

## (undated) DEVICE — LAG MINOR PROCEDURE: Brand: MEDLINE INDUSTRIES, INC.

## (undated) DEVICE — PREP SOL POVIDONE/IODINE BT 4OZ

## (undated) DEVICE — GLV SURG EUDERMIC PF LTX 8 STRL

## (undated) DEVICE — PAD,ABDOMINAL,8"X10",ST,LF: Brand: MEDLINE

## (undated) DEVICE — PANTY KNIT MATERN L/XL

## (undated) DEVICE — JELLY,LUBE,STERILE,FLIP TOP,TUBE,4-OZ: Brand: MEDLINE

## (undated) DEVICE — TRY SKINPREP DRYPREP

## (undated) DEVICE — VIOLET BRAIDED (POLYGLACTIN 910), SYNTHETIC ABSORBABLE SUTURE: Brand: COATED VICRYL

## (undated) DEVICE — TP SILK DURAPORE 2 IN

## (undated) DEVICE — TOWEL,OR,DSP,ST,BLUE,STD,4/PK,20PK/CS: Brand: MEDLINE

## (undated) DEVICE — SOL ANTISEP SCRB PVPI 7.5PCT 4OZ

## (undated) DEVICE — SPNG GZ WOVN 4X4IN 12PLY 10/BX STRL